# Patient Record
Sex: FEMALE | Race: WHITE | URBAN - METROPOLITAN AREA
[De-identification: names, ages, dates, MRNs, and addresses within clinical notes are randomized per-mention and may not be internally consistent; named-entity substitution may affect disease eponyms.]

---

## 2021-03-01 ENCOUNTER — EMERGENCY (EMERGENCY)
Facility: HOSPITAL | Age: 26
LOS: 1 days | Discharge: HOME | End: 2021-03-01
Attending: EMERGENCY MEDICINE | Admitting: EMERGENCY MEDICINE
Payer: COMMERCIAL

## 2021-03-01 VITALS
RESPIRATION RATE: 18 BRPM | HEIGHT: 64 IN | SYSTOLIC BLOOD PRESSURE: 132 MMHG | WEIGHT: 149.91 LBS | TEMPERATURE: 98 F | OXYGEN SATURATION: 97 % | DIASTOLIC BLOOD PRESSURE: 95 MMHG | HEART RATE: 88 BPM

## 2021-03-01 VITALS
RESPIRATION RATE: 16 BRPM | DIASTOLIC BLOOD PRESSURE: 66 MMHG | TEMPERATURE: 98 F | OXYGEN SATURATION: 99 % | HEART RATE: 70 BPM | SYSTOLIC BLOOD PRESSURE: 100 MMHG

## 2021-03-01 DIAGNOSIS — R10.30 LOWER ABDOMINAL PAIN, UNSPECIFIED: ICD-10-CM

## 2021-03-01 DIAGNOSIS — R35.0 FREQUENCY OF MICTURITION: ICD-10-CM

## 2021-03-01 DIAGNOSIS — R30.0 DYSURIA: ICD-10-CM

## 2021-03-01 DIAGNOSIS — Z04.41 ENCOUNTER FOR EXAMINATION AND OBSERVATION FOLLOWING ALLEGED ADULT RAPE: ICD-10-CM

## 2021-03-01 DIAGNOSIS — R11.0 NAUSEA: ICD-10-CM

## 2021-03-01 LAB
APPEARANCE UR: ABNORMAL
BACTERIA # UR AUTO: PRESENT /HPF
BILIRUB UR-MCNC: ABNORMAL
COD CRY URNS QL: ABNORMAL /HPF
COLOR SPEC: YELLOW — SIGNIFICANT CHANGE UP
COMMENT - URINE: SIGNIFICANT CHANGE UP
DIFF PNL FLD: ABNORMAL
EPI CELLS # UR: ABNORMAL /HPF (ref 0–5)
GLUCOSE UR QL: NEGATIVE — SIGNIFICANT CHANGE UP
HCG UR QL: NEGATIVE — SIGNIFICANT CHANGE UP
KETONES UR-MCNC: NEGATIVE — SIGNIFICANT CHANGE UP
LEUKOCYTE ESTERASE UR-ACNC: ABNORMAL
NITRITE UR-MCNC: NEGATIVE — SIGNIFICANT CHANGE UP
PH UR: 6 — SIGNIFICANT CHANGE UP (ref 5–8)
PROT UR-MCNC: ABNORMAL MG/DL
RBC CASTS # UR COMP ASSIST: ABNORMAL /HPF
SP GR SPEC: >=1.03 — SIGNIFICANT CHANGE UP (ref 1–1.03)
UROBILINOGEN FLD QL: 0.2 E.U./DL — SIGNIFICANT CHANGE UP
WBC UR QL: ABNORMAL /HPF

## 2021-03-01 PROCEDURE — 99053 MED SERV 10PM-8AM 24 HR FAC: CPT

## 2021-03-01 PROCEDURE — 99285 EMERGENCY DEPT VISIT HI MDM: CPT

## 2021-03-01 PROCEDURE — 74177 CT ABD & PELVIS W/CONTRAST: CPT | Mod: 26

## 2021-03-01 PROCEDURE — 76856 US EXAM PELVIC COMPLETE: CPT | Mod: 26

## 2021-03-01 RX ORDER — LEVONORGESTREL 1.5 MG/1
1.5 TABLET ORAL ONCE
Refills: 0 | Status: COMPLETED | OUTPATIENT
Start: 2021-03-01 | End: 2021-03-01

## 2021-03-01 RX ORDER — IBUPROFEN 200 MG
600 TABLET ORAL ONCE
Refills: 0 | Status: COMPLETED | OUTPATIENT
Start: 2021-03-01 | End: 2021-03-01

## 2021-03-01 RX ORDER — EMTRICITABINE AND TENOFOVIR DISOPROXIL FUMARATE 200; 300 MG/1; MG/1
1 TABLET, FILM COATED ORAL ONCE
Refills: 0 | Status: COMPLETED | OUTPATIENT
Start: 2021-03-01 | End: 2021-03-01

## 2021-03-01 RX ORDER — RALTEGRAVIR 400 MG/1
1 TABLET, FILM COATED ORAL
Qty: 42 | Refills: 0
Start: 2021-03-01 | End: 2021-03-21

## 2021-03-01 RX ORDER — RALTEGRAVIR 400 MG/1
1 TABLET, FILM COATED ORAL
Qty: 14 | Refills: 0
Start: 2021-03-01 | End: 2021-03-07

## 2021-03-01 RX ORDER — EMTRICITABINE AND TENOFOVIR DISOPROXIL FUMARATE 200; 300 MG/1; MG/1
1 TABLET, FILM COATED ORAL
Qty: 7 | Refills: 0
Start: 2021-03-01 | End: 2021-03-07

## 2021-03-01 RX ORDER — METRONIDAZOLE 500 MG
2000 TABLET ORAL ONCE
Refills: 0 | Status: COMPLETED | OUTPATIENT
Start: 2021-03-01 | End: 2021-03-01

## 2021-03-01 RX ORDER — EMTRICITABINE AND TENOFOVIR DISOPROXIL FUMARATE 200; 300 MG/1; MG/1
1 TABLET, FILM COATED ORAL
Qty: 21 | Refills: 0
Start: 2021-03-01 | End: 2021-03-21

## 2021-03-01 RX ORDER — ONDANSETRON 8 MG/1
4 TABLET, FILM COATED ORAL ONCE
Refills: 0 | Status: COMPLETED | OUTPATIENT
Start: 2021-03-01 | End: 2021-03-01

## 2021-03-01 RX ORDER — CEFTRIAXONE 500 MG/1
250 INJECTION, POWDER, FOR SOLUTION INTRAMUSCULAR; INTRAVENOUS ONCE
Refills: 0 | Status: COMPLETED | OUTPATIENT
Start: 2021-03-01 | End: 2021-03-01

## 2021-03-01 RX ORDER — RALTEGRAVIR 400 MG/1
400 TABLET, FILM COATED ORAL ONCE
Refills: 0 | Status: COMPLETED | OUTPATIENT
Start: 2021-03-01 | End: 2021-03-01

## 2021-03-01 RX ORDER — OXYCODONE AND ACETAMINOPHEN 5; 325 MG/1; MG/1
1 TABLET ORAL ONCE
Refills: 0 | Status: DISCONTINUED | OUTPATIENT
Start: 2021-03-01 | End: 2021-03-01

## 2021-03-01 RX ORDER — NITROFURANTOIN MACROCRYSTAL 50 MG
1 CAPSULE ORAL
Qty: 6 | Refills: 0
Start: 2021-03-01 | End: 2021-03-03

## 2021-03-01 RX ORDER — NITROFURANTOIN MACROCRYSTAL 50 MG
100 CAPSULE ORAL ONCE
Refills: 0 | Status: COMPLETED | OUTPATIENT
Start: 2021-03-01 | End: 2021-03-01

## 2021-03-01 RX ORDER — AZITHROMYCIN 500 MG/1
1000 TABLET, FILM COATED ORAL ONCE
Refills: 0 | Status: COMPLETED | OUTPATIENT
Start: 2021-03-01 | End: 2021-03-01

## 2021-03-01 RX ADMIN — Medication 600 MILLIGRAM(S): at 12:23

## 2021-03-01 RX ADMIN — EMTRICITABINE AND TENOFOVIR DISOPROXIL FUMARATE 1 TABLET(S): 200; 300 TABLET, FILM COATED ORAL at 15:19

## 2021-03-01 RX ADMIN — ONDANSETRON 4 MILLIGRAM(S): 8 TABLET, FILM COATED ORAL at 09:24

## 2021-03-01 RX ADMIN — AZITHROMYCIN 1000 MILLIGRAM(S): 500 TABLET, FILM COATED ORAL at 15:16

## 2021-03-01 RX ADMIN — Medication 100 MILLIGRAM(S): at 09:24

## 2021-03-01 RX ADMIN — CEFTRIAXONE 250 MILLIGRAM(S): 500 INJECTION, POWDER, FOR SOLUTION INTRAMUSCULAR; INTRAVENOUS at 15:19

## 2021-03-01 RX ADMIN — OXYCODONE AND ACETAMINOPHEN 1 TABLET(S): 5; 325 TABLET ORAL at 12:24

## 2021-03-01 RX ADMIN — Medication 600 MILLIGRAM(S): at 09:24

## 2021-03-01 RX ADMIN — RALTEGRAVIR 400 MILLIGRAM(S): 400 TABLET, FILM COATED ORAL at 15:19

## 2021-03-01 RX ADMIN — Medication 2000 MILLIGRAM(S): at 15:21

## 2021-03-01 RX ADMIN — LEVONORGESTREL 1.5 MILLIGRAM(S): 1.5 TABLET ORAL at 15:20
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
## 2021-03-01 NOTE — ED PROVIDER NOTE - CARE PROVIDERS DIRECT ADDRESSES
[email or web address]

## 2021-03-01 NOTE — ED PROVIDER NOTE - NSFOLLOWUPINSTRUCTIONS_ED_ALL_ED_FT
Rest as needed    Take motrin and tylenol as needed for pain    RETURN TO THE EMERGENCY ROOM FOR:  FEVER   WORSENING PAIN  VOMITING  UNUSUAL OR NEW DISCHARGE OR ABNORMAL VAGINAL BLEEDING OR FOR ANY SYMPTOMS THAT CONCERN YOU.

## 2021-03-01 NOTE — ED PROVIDER NOTE - CARE PROVIDER_API CALL
Keira Santillan)  Internal Medicine  [address] [address]  Phone: [phone]  Fax: [phone]  Follow Up Time:
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
## 2021-03-01 NOTE — ED PROVIDER NOTE - PHYSICAL EXAMINATION
VITAL SIGNS: I have reviewed nursing notes and confirm.  CONSTITUTIONAL: Well-developed; well-nourished.  SKIN: Skin is warm and dry, no acute rash.  HEAD: Normocephalic; atraumatic.  EYES: PERRL, EOM intact; conjunctiva and sclera clear.  ENT: No nasal discharge; airway clear.  NECK: Supple; non tender.  CARD: S1, S2 normal; no murmurs, gallops, or rubs. Regular rate and rhythm.  RESP: No wheezes, rales or rhonchi.  ABD: Normal bowel sounds; soft; non-distended; no hepatosplenomegaly; +TTP throughout lower abd and epigastric area; negative Montez's.   MSK: Normal ROM. No clubbing, cyanosis or edema.  NEURO: Alert, oriented. Grossly unremarkable.  PSYCH: Cooperative, appropriate for situation. VITAL SIGNS: I have reviewed nursing notes and confirm.  CONSTITUTIONAL: Well-developed; well-nourished.  SKIN: Skin is warm and dry, no acute rash.  HEAD: Normocephalic; atraumatic.  EYES: PERRL, EOM intact; conjunctiva and sclera clear.  ENT: No nasal discharge; airway clear.  NECK: Supple; non tender.  CARD: S1, S2 normal; no murmurs, gallops, or rubs. Regular rate and rhythm.  RESP: No wheezes, rales or rhonchi.  ABD: Normal bowel sounds; soft; non-distended; no hepatosplenomegaly; +TTP throughout lower abd and epigastric area; negative Montez's.   : No CVAT.  Pelvic Exam - see details from SAFE chart.   MSK: Normal ROM. No clubbing, cyanosis or edema.  NEURO: Alert, oriented. Grossly unremarkable.  PSYCH: Cooperative, appropriate for situation.
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
## 2021-03-01 NOTE — ED PROVIDER NOTE - PATIENT PORTAL LINK FT
You can access the FollowMyHealth Patient Portal offered by [facility] by registering at the following website: [email or web address]. By joining "[employer]’s FollowMyHealth portal, you will also be able to view your health information using other applications (apps) compatible with our system.

## 2021-03-02 LAB
CULTURE RESULTS: SIGNIFICANT CHANGE UP
SPECIMEN SOURCE: SIGNIFICANT CHANGE UP

## 2021-05-07 ENCOUNTER — EMERGENCY (EMERGENCY)
Facility: HOSPITAL | Age: 26
LOS: 1 days | Discharge: HOME | End: 2021-05-07
Admitting: EMERGENCY MEDICINE
Payer: COMMERCIAL

## 2021-05-07 VITALS
OXYGEN SATURATION: 99 % | DIASTOLIC BLOOD PRESSURE: 76 MMHG | WEIGHT: 149.91 LBS | HEART RATE: 80 BPM | SYSTOLIC BLOOD PRESSURE: 116 MMHG | RESPIRATION RATE: 17 BRPM | HEIGHT: 64 IN | TEMPERATURE: 98 F

## 2021-05-07 DIAGNOSIS — Z88.8 ALLERGY STATUS TO OTHER DRUGS, MEDICAMENTS AND BIOLOGICAL SUBSTANCES STATUS: ICD-10-CM

## 2021-05-07 DIAGNOSIS — R10.2 PELVIC AND PERINEAL PAIN: ICD-10-CM
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
## 2021-05-07 DIAGNOSIS — T74.21XA ADULT SEXUAL ABUSE, CONFIRMED, INITIAL ENCOUNTER: ICD-10-CM

## 2021-05-07 PROBLEM — Z78.9 OTHER SPECIFIED HEALTH STATUS: Chronic | Status: ACTIVE | Noted: 2021-03-01

## 2021-05-07 LAB
ALBUMIN SERPL ELPH-MCNC: 4.1 G/DL — SIGNIFICANT CHANGE UP (ref 3.4–5)
ALP SERPL-CCNC: 45 U/L — SIGNIFICANT CHANGE UP (ref 40–120)
ALT FLD-CCNC: 17 U/L — SIGNIFICANT CHANGE UP (ref 12–42)
ANION GAP SERPL CALC-SCNC: 9 MMOL/L — SIGNIFICANT CHANGE UP (ref 9–16)
AST SERPL-CCNC: 15 U/L — SIGNIFICANT CHANGE UP (ref 15–37)
BILIRUB SERPL-MCNC: 0.3 MG/DL — SIGNIFICANT CHANGE UP (ref 0.2–1.2)
BUN SERPL-MCNC: 8 MG/DL — SIGNIFICANT CHANGE UP (ref 7–23)
CALCIUM SERPL-MCNC: 9.1 MG/DL — SIGNIFICANT CHANGE UP (ref 8.5–10.5)
CHLORIDE SERPL-SCNC: 103 MMOL/L — SIGNIFICANT CHANGE UP (ref 96–108)
CO2 SERPL-SCNC: 28 MMOL/L — SIGNIFICANT CHANGE UP (ref 22–31)
CREAT SERPL-MCNC: 0.69 MG/DL — SIGNIFICANT CHANGE UP (ref 0.5–1.3)
GLUCOSE SERPL-MCNC: 90 MG/DL — SIGNIFICANT CHANGE UP (ref 70–99)
HCG SERPL-ACNC: 1 MIU/ML — SIGNIFICANT CHANGE UP
HCG UR QL: NEGATIVE — SIGNIFICANT CHANGE UP
HCT VFR BLD CALC: 39.1 % — SIGNIFICANT CHANGE UP (ref 34.5–45)
HGB BLD-MCNC: 12.7 G/DL — SIGNIFICANT CHANGE UP (ref 11.5–15.5)
HIV 1 & 2 AB SERPL IA.RAPID: SIGNIFICANT CHANGE UP
MCHC RBC-ENTMCNC: 26.7 PG — LOW (ref 27–34)
MCHC RBC-ENTMCNC: 32.5 GM/DL — SIGNIFICANT CHANGE UP (ref 32–36)
MCV RBC AUTO: 82.1 FL — SIGNIFICANT CHANGE UP (ref 80–100)
NRBC # BLD: 0 /100 WBCS — SIGNIFICANT CHANGE UP (ref 0–0)
PLATELET # BLD AUTO: 330 K/UL — SIGNIFICANT CHANGE UP (ref 150–400)
POTASSIUM SERPL-MCNC: 3.6 MMOL/L — SIGNIFICANT CHANGE UP (ref 3.5–5.3)
POTASSIUM SERPL-SCNC: 3.6 MMOL/L — SIGNIFICANT CHANGE UP (ref 3.5–5.3)
PROT SERPL-MCNC: 7.8 G/DL — SIGNIFICANT CHANGE UP (ref 6.4–8.2)
RBC # BLD: 4.76 M/UL — SIGNIFICANT CHANGE UP (ref 3.8–5.2)
RBC # FLD: 14.6 % — HIGH (ref 10.3–14.5)
SODIUM SERPL-SCNC: 140 MMOL/L — SIGNIFICANT CHANGE UP (ref 132–145)
WBC # BLD: 8.38 K/UL — SIGNIFICANT CHANGE UP (ref 3.8–10.5)
WBC # FLD AUTO: 8.38 K/UL — SIGNIFICANT CHANGE UP (ref 3.8–10.5)

## 2021-05-07 PROCEDURE — 99284 EMERGENCY DEPT VISIT MOD MDM: CPT

## 2021-05-07 RX ORDER — RALTEGRAVIR 400 MG/1
1 TABLET, FILM COATED ORAL
Qty: 42 | Refills: 0
Start: 2021-05-07 | End: 2021-05-27

## 2021-05-07 RX ORDER — EMTRICITABINE AND TENOFOVIR DISOPROXIL FUMARATE 200; 300 MG/1; MG/1
1 TABLET, FILM COATED ORAL ONCE
Refills: 0 | Status: COMPLETED | OUTPATIENT
Start: 2021-05-07 | End: 2021-05-07

## 2021-05-07 RX ORDER — EMTRICITABINE AND TENOFOVIR DISOPROXIL FUMARATE 200; 300 MG/1; MG/1
1 TABLET, FILM COATED ORAL
Qty: 21 | Refills: 0
Start: 2021-05-07 | End: 2021-05-27

## 2021-05-07 RX ORDER — CEFTRIAXONE 500 MG/1
250 INJECTION, POWDER, FOR SOLUTION INTRAMUSCULAR; INTRAVENOUS ONCE
Refills: 0 | Status: COMPLETED | OUTPATIENT
Start: 2021-05-07 | End: 2021-05-07

## 2021-05-07 RX ORDER — RALTEGRAVIR 400 MG/1
400 TABLET, FILM COATED ORAL ONCE
Refills: 0 | Status: COMPLETED | OUTPATIENT
Start: 2021-05-07 | End: 2021-05-07

## 2021-05-07 RX ORDER — METRONIDAZOLE 500 MG
2000 TABLET ORAL ONCE
Refills: 0 | Status: COMPLETED | OUTPATIENT
Start: 2021-05-07 | End: 2021-05-07

## 2021-05-07 RX ORDER — ONDANSETRON 8 MG/1
4 TABLET, FILM COATED ORAL ONCE
Refills: 0 | Status: COMPLETED | OUTPATIENT
Start: 2021-05-07 | End: 2021-05-07

## 2021-05-07 RX ORDER — LEVONORGESTREL 1.5 MG/1
1.5 TABLET ORAL ONCE
Refills: 0 | Status: COMPLETED | OUTPATIENT
Start: 2021-05-07 | End: 2021-05-07
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
## 2021-05-07 NOTE — ED PROVIDER NOTE - PATIENT PORTAL LINK FT
You can access the FollowMyHealth Patient Portal offered by [facility] by registering at the following website: [email or web address]. By joining [employer]’s FollowMyHealth portal, you will also be able to view your health information using other applications (apps) compatible with our system.
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
## 2021-05-08 VITALS
RESPIRATION RATE: 18 BRPM | DIASTOLIC BLOOD PRESSURE: 72 MMHG | SYSTOLIC BLOOD PRESSURE: 106 MMHG | OXYGEN SATURATION: 98 % | HEART RATE: 82 BPM

## 2021-05-08 RX ORDER — ACETAMINOPHEN 500 MG
650 TABLET ORAL ONCE
Refills: 0 | Status: DISCONTINUED | OUTPATIENT
Start: 2021-05-08 | End: 2021-05-11

## 2021-05-08 RX ADMIN — EMTRICITABINE AND TENOFOVIR DISOPROXIL FUMARATE 1 TABLET(S): 200; 300 TABLET, FILM COATED ORAL at 02:33

## 2021-05-08 RX ADMIN — ONDANSETRON 4 MILLIGRAM(S): 8 TABLET, FILM COATED ORAL at 02:34

## 2021-05-08 RX ADMIN — Medication 2000 MILLIGRAM(S): at 02:33

## 2021-05-08 RX ADMIN — Medication 100 MILLIGRAM(S): at 02:33

## 2021-05-08 RX ADMIN — LEVONORGESTREL 1.5 MILLIGRAM(S): 1.5 TABLET ORAL at 02:33

## 2021-05-08 RX ADMIN — CEFTRIAXONE 250 MILLIGRAM(S): 500 INJECTION, POWDER, FOR SOLUTION INTRAMUSCULAR; INTRAVENOUS at 02:33

## 2021-05-08 RX ADMIN — RALTEGRAVIR 400 MILLIGRAM(S): 400 TABLET, FILM COATED ORAL at 02:34
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
## 2021-05-08 NOTE — ED ADULT NURSE NOTE - CHPI ED NUR SYMPTOMS POS
lower abdominal/pelvic pain . Pt is currently on her menstrual cycle/ABDOMINAL PAIN/VAGINAL BLEEDING

## 2022-02-28 NOTE — ED PROVIDER NOTE - PSYCHIATRIC SPEECH
Addended by: Terrie Segal on: 3/1/2021 09:07 AM     Modules accepted: Orders
The Service to Acupuncture order in work queue [id number] requested on 12/23/2021 has been cancelled as patient declined services. Ordering provider has been notified..    Please contact patient if further communication is needed.    
clear

## 2024-11-20 ENCOUNTER — EMERGENCY (EMERGENCY)
Facility: HOSPITAL | Age: 29
LOS: 1 days | Discharge: HOME | End: 2024-11-20
Attending: EMERGENCY MEDICINE | Admitting: EMERGENCY MEDICINE
Payer: COMMERCIAL

## 2024-11-20 VITALS
DIASTOLIC BLOOD PRESSURE: 71 MMHG | SYSTOLIC BLOOD PRESSURE: 116 MMHG | HEART RATE: 79 BPM | TEMPERATURE: 98 F | RESPIRATION RATE: 16 BRPM | OXYGEN SATURATION: 99 %

## 2024-11-20 VITALS
RESPIRATION RATE: 17 BRPM | HEART RATE: 84 BPM | HEIGHT: 64 IN | OXYGEN SATURATION: 98 % | TEMPERATURE: 98 F | WEIGHT: 154.32 LBS | SYSTOLIC BLOOD PRESSURE: 120 MMHG | DIASTOLIC BLOOD PRESSURE: 68 MMHG
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
## 2024-11-20 DIAGNOSIS — R10.816 EPIGASTRIC ABDOMINAL TENDERNESS: ICD-10-CM

## 2024-11-20 DIAGNOSIS — Z88.8 ALLERGY STATUS TO OTHER DRUGS, MEDICAMENTS AND BIOLOGICAL SUBSTANCES: ICD-10-CM

## 2024-11-20 DIAGNOSIS — T74.21XA ADULT SEXUAL ABUSE, CONFIRMED, INITIAL ENCOUNTER: ICD-10-CM

## 2024-11-20 LAB
ALBUMIN SERPL ELPH-MCNC: 4 G/DL — SIGNIFICANT CHANGE UP (ref 3.4–5)
ALP SERPL-CCNC: 67 U/L — SIGNIFICANT CHANGE UP (ref 40–120)
ALT FLD-CCNC: 21 U/L — SIGNIFICANT CHANGE UP (ref 12–42)
ANION GAP SERPL CALC-SCNC: 8 MMOL/L — LOW (ref 9–16)
APPEARANCE UR: CLEAR — SIGNIFICANT CHANGE UP
AST SERPL-CCNC: 14 U/L — LOW (ref 15–37)
BASOPHILS # BLD AUTO: 0.08 K/UL — SIGNIFICANT CHANGE UP (ref 0–0.2)
BASOPHILS NFR BLD AUTO: 0.9 % — SIGNIFICANT CHANGE UP (ref 0–2)
BILIRUB SERPL-MCNC: 0.3 MG/DL — SIGNIFICANT CHANGE UP (ref 0.2–1.2)
BILIRUB UR-MCNC: NEGATIVE — SIGNIFICANT CHANGE UP
BUN SERPL-MCNC: 12 MG/DL — SIGNIFICANT CHANGE UP (ref 7–23)
CALCIUM SERPL-MCNC: 8.6 MG/DL — SIGNIFICANT CHANGE UP (ref 8.5–10.5)
CHLORIDE SERPL-SCNC: 107 MMOL/L — SIGNIFICANT CHANGE UP (ref 96–108)
CO2 SERPL-SCNC: 27 MMOL/L — SIGNIFICANT CHANGE UP (ref 22–31)
COLOR SPEC: YELLOW — SIGNIFICANT CHANGE UP
CREAT SERPL-MCNC: 0.86 MG/DL — SIGNIFICANT CHANGE UP (ref 0.5–1.3)
DIFF PNL FLD: NEGATIVE — SIGNIFICANT CHANGE UP
EGFR: 93 ML/MIN/1.73M2 — SIGNIFICANT CHANGE UP
EOSINOPHIL # BLD AUTO: 0.04 K/UL — SIGNIFICANT CHANGE UP (ref 0–0.5)
EOSINOPHIL NFR BLD AUTO: 0.4 % — SIGNIFICANT CHANGE UP (ref 0–6)
GLUCOSE SERPL-MCNC: 87 MG/DL — SIGNIFICANT CHANGE UP (ref 70–99)
GLUCOSE UR QL: NEGATIVE MG/DL — SIGNIFICANT CHANGE UP
HCG SERPL-ACNC: <1 MIU/ML — SIGNIFICANT CHANGE UP
HCG UR QL: NEGATIVE — SIGNIFICANT CHANGE UP
HCT VFR BLD CALC: 41.1 % — SIGNIFICANT CHANGE UP (ref 34.5–45)
HGB BLD-MCNC: 13.9 G/DL — SIGNIFICANT CHANGE UP (ref 11.5–15.5)
HIV 1 & 2 AB SERPL IA.RAPID: SIGNIFICANT CHANGE UP
IMM GRANULOCYTES NFR BLD AUTO: 0.3 % — SIGNIFICANT CHANGE UP (ref 0–0.9)
KETONES UR-MCNC: ABNORMAL MG/DL
LEUKOCYTE ESTERASE UR-ACNC: NEGATIVE — SIGNIFICANT CHANGE UP
LIDOCAIN IGE QN: 26 U/L — SIGNIFICANT CHANGE UP (ref 16–77)
LYMPHOCYTES # BLD AUTO: 2.85 K/UL — SIGNIFICANT CHANGE UP (ref 1–3.3)
LYMPHOCYTES # BLD AUTO: 30.7 % — SIGNIFICANT CHANGE UP (ref 13–44)
MCHC RBC-ENTMCNC: 28.8 PG — SIGNIFICANT CHANGE UP (ref 27–34)
MCHC RBC-ENTMCNC: 33.8 G/DL — SIGNIFICANT CHANGE UP (ref 32–36)
MCV RBC AUTO: 85.1 FL — SIGNIFICANT CHANGE UP (ref 80–100)
MONOCYTES # BLD AUTO: 0.51 K/UL — SIGNIFICANT CHANGE UP (ref 0–0.9)
MONOCYTES NFR BLD AUTO: 5.5 % — SIGNIFICANT CHANGE UP (ref 2–14)
NEUTROPHILS # BLD AUTO: 5.77 K/UL — SIGNIFICANT CHANGE UP (ref 1.8–7.4)
NEUTROPHILS NFR BLD AUTO: 62.2 % — SIGNIFICANT CHANGE UP (ref 43–77)
NITRITE UR-MCNC: NEGATIVE — SIGNIFICANT CHANGE UP
NRBC # BLD: 0 /100 WBCS — SIGNIFICANT CHANGE UP (ref 0–0)
PH UR: 7 — SIGNIFICANT CHANGE UP (ref 5–8)
PLATELET # BLD AUTO: 364 K/UL — SIGNIFICANT CHANGE UP (ref 150–400)
POTASSIUM SERPL-MCNC: 3.9 MMOL/L — SIGNIFICANT CHANGE UP (ref 3.5–5.3)
POTASSIUM SERPL-SCNC: 3.9 MMOL/L — SIGNIFICANT CHANGE UP (ref 3.5–5.3)
PROT SERPL-MCNC: 8.3 G/DL — HIGH (ref 6.4–8.2)
PROT UR-MCNC: NEGATIVE MG/DL — SIGNIFICANT CHANGE UP
RBC # BLD: 4.83 M/UL — SIGNIFICANT CHANGE UP (ref 3.8–5.2)
RBC # FLD: 13.3 % — SIGNIFICANT CHANGE UP (ref 10.3–14.5)
SODIUM SERPL-SCNC: 142 MMOL/L — SIGNIFICANT CHANGE UP (ref 132–145)
SP GR SPEC: 1.05 — HIGH (ref 1–1.03)
UROBILINOGEN FLD QL: 1 MG/DL — SIGNIFICANT CHANGE UP (ref 0.2–1)
WBC # BLD: 9.28 K/UL — SIGNIFICANT CHANGE UP (ref 3.8–10.5)
WBC # FLD AUTO: 9.28 K/UL — SIGNIFICANT CHANGE UP (ref 3.8–10.5)

## 2024-11-20 PROCEDURE — 74177 CT ABD & PELVIS W/CONTRAST: CPT | Mod: 26,MC

## 2024-11-20 PROCEDURE — 99285 EMERGENCY DEPT VISIT HI MDM: CPT

## 2024-11-20 RX ORDER — MAGNESIUM, ALUMINUM HYDROXIDE 200-200 MG
30 TABLET,CHEWABLE ORAL ONCE
Refills: 0 | Status: COMPLETED | OUTPATIENT
Start: 2024-11-20 | End: 2024-11-20

## 2024-11-20 RX ORDER — DOXYCYCLINE HYCLATE 100 MG/1
1 TABLET, FILM COATED ORAL
Qty: 14 | Refills: 0
Start: 2024-11-20 | End: 2024-11-26

## 2024-11-20 RX ORDER — CEFTRIAXONE SODIUM 10 G
500 VIAL (EA) INJECTION ONCE
Refills: 0 | Status: COMPLETED | OUTPATIENT
Start: 2024-11-20 | End: 2024-11-20

## 2024-11-20 RX ORDER — EMTRICITABINE AND TENOFOVIR DISOPROXIL FUMARATE 200; 300 MG/1; MG/1
1 TABLET, FILM COATED ORAL
Qty: 30 | Refills: 0
Start: 2024-11-20 | End: 2024-12-19

## 2024-11-20 RX ORDER — LEVONORGESTREL 1.5 MG/1
1.5 TABLET ORAL ONCE
Refills: 0 | Status: COMPLETED | OUTPATIENT
Start: 2024-11-20 | End: 2024-11-20

## 2024-11-20 RX ORDER — DOXYCYCLINE HYCLATE 100 MG/1
100 TABLET, FILM COATED ORAL ONCE
Refills: 0 | Status: COMPLETED | OUTPATIENT
Start: 2024-11-20 | End: 2024-11-20

## 2024-11-20 RX ORDER — FAMOTIDINE 10 MG/ML
20 INJECTION INTRAVENOUS ONCE
Refills: 0 | Status: COMPLETED | OUTPATIENT
Start: 2024-11-20 | End: 2024-11-20

## 2024-11-20 RX ORDER — RALTEGRAVIR 400 MG/1
1 TABLET, FILM COATED ORAL
Qty: 60 | Refills: 0
Start: 2024-11-20 | End: 2024-12-19

## 2024-11-20 RX ORDER — METRONIDAZOLE 250 MG/1
1 TABLET ORAL
Qty: 14 | Refills: 0
Start: 2024-11-20 | End: 2024-11-26

## 2024-11-20 RX ORDER — LIDOCAINE HYDROCHLORIDE 40 MG/ML
10 SOLUTION TOPICAL ONCE
Refills: 0 | Status: COMPLETED | OUTPATIENT
Start: 2024-11-20 | End: 2024-11-20

## 2024-11-20 RX ADMIN — Medication 2.4 MILLION UNIT(S): at 13:32

## 2024-11-20 RX ADMIN — LIDOCAINE HYDROCHLORIDE 10 MILLILITER(S): 40 SOLUTION TOPICAL at 11:24

## 2024-11-20 RX ADMIN — Medication 30 MILLILITER(S): at 11:23

## 2024-11-20 RX ADMIN — Medication 500 MILLIGRAM(S): at 13:31

## 2024-11-20 RX ADMIN — DOXYCYCLINE HYCLATE 100 MILLIGRAM(S): 100 TABLET, FILM COATED ORAL at 13:31

## 2024-11-20 RX ADMIN — LEVONORGESTREL 1.5 MILLIGRAM(S): 1.5 TABLET ORAL at 13:34

## 2024-11-20 RX ADMIN — FAMOTIDINE 20 MILLIGRAM(S): 10 INJECTION INTRAVENOUS at 11:23
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
## 2024-11-20 NOTE — ED PROVIDER NOTE - PATIENT PORTAL LINK FT
You can access the FollowMyHealth Patient Portal offered by [facility] by registering at the following website: [email or web address]. By joining [employer]’s FollowMyHealth portal, you will also be able to view your health information using other applications (apps) compatible with our system.
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
## 2024-11-21 LAB
CULTURE RESULTS: SIGNIFICANT CHANGE UP
SPECIMEN SOURCE: SIGNIFICANT CHANGE UP